# Patient Record
(demographics unavailable — no encounter records)

---

## 2024-11-05 NOTE — PHYSICAL EXAM
[Normal Breath Sounds] : Normal breath sounds [Normal Rate and Rhythm] : normal rate and rhythm [2+] : left 2+ [Varicose Veins Of Lower Extremities] : bilaterally [Ankle Swelling Bilaterally] : severe [] : present [Ankle Swelling On The Left] : moderate [No Rash or Lesion] : No rash or lesion [Alert] : alert [Calm] : calm [JVD] : no jugular venous distention  [Ankle Swelling (On Exam)] : not present [Abdomen Tenderness] : ~T ~M No abdominal tenderness [de-identified] : Appears well

## 2024-11-05 NOTE — ASSESSMENT
[FreeTextEntry1] : 66-year-old male with venous insufficiency status post right lower extremity ablation with stab phlebectomy.  Pedal pulses are intact bilaterally.  There is no evidence of significant arterial sufficiency at this time.  Patient has recovered as anticipated postprocedure.  No further vascular intervention is required at this time.  Patient to follow-up as needed.

## 2024-11-05 NOTE — HISTORY OF PRESENT ILLNESS
[FreeTextEntry1] : Patient is a 66-year-old gentleman with venous insufficiency and symptomatic varicose veins of bilateral lower extremities with remote history of right leg DVT who presents to the office today for follow-up status post Right leg venous ablation.    Denies tissue loss.  Denies rest pain or claudication symptoms.  Denies fever or chills.  Denies chest pain or shortness of breath.

## 2024-11-27 NOTE — REVIEW OF SYSTEMS
[Weight Gain (___ Lbs)] : [unfilled] ~Ulb weight gain [Negative] : Heme/Lymph [SOB] : no shortness of breath [Dyspnea on exertion] : not dyspnea during exertion [Chest Discomfort] : no chest discomfort [Lower Ext Edema] : no extremity edema [Leg Claudication] : no intermittent leg claudication [Palpitations] : no palpitations [Syncope] : no syncope

## 2024-11-27 NOTE — DISCUSSION/SUMMARY
[FreeTextEntry1] : The patient is a 66-year-old gentleman factor V Leiden deficiency, hiatal hernia, DVT in the past, BPH, HTN, DM, PVC, with reactive HTN from neck pain and BPH keeping him up. .  #1 HTN- c/w amlodipine 5mg #2 Palpitations- PVCs controlled on toprol 50mg, #3 Endo- c/w levothyroxine, metformin for DM #4 Neuropathy- on nortryptilline #5 BPH- on tamsulosin,f/u urology #6 Factor V Leiden- stable, Blood type A, RLE superficial thrombophlebitis on Xarelto lifelong, f/u Dr. Joaquin #7 Ortho- herniated disc, f/u ortho 87 General- We discussed adherence to a low-fat low cholesterol, ADA diet, weight loss and regular daily exercise. Received Pfizer vaccines and booster.   [EKG obtained to assist in diagnosis and management of assessed problem(s)] : EKG obtained to assist in diagnosis and management of assessed problem(s)

## 2024-11-27 NOTE — HISTORY OF PRESENT ILLNESS
[FreeTextEntry1] : Seth has noticed increase in BP to 140s. Had venous ablation but no discomfort. Has slight dizziness. ENT negative. Cervical neck disc disease. Glucose 120s.

## 2025-01-25 NOTE — HISTORY OF PRESENT ILLNESS
[FreeTextEntry1] : CC: diabetes  This is a 66-year-old male with T2DM, primary hypothyroidism, vitamin D insufficiency, BPH, DVT, spinal stenosis, here for a follow-up.  He was diagnosed with diabetes approximately age 64. He is currently on metformin  mg 2x a day. He SMBG in the mornings and reports levels between 110-135.  Last ophthalmology visit was February 2024. Denies retinopathy.  Has neuropathy. Also reports numbness due to spinal stenosis. No known nephropathy. He is currently on vitamin D3 5000 IU 3x weekly and vitamin B12 1000 mcg daily.  He is not on an ACE inhibitor or ARB. He is not on a statin.   He is on levothyroxine 25 mcg daily.

## 2025-01-25 NOTE — ADDENDUM
[FreeTextEntry1] : By signing my name below, I, Katelyn Jaxon, attest that this document has been prepared under the direction and in the presence of Dr. Valdivia.     I, Bryan Valdivia MD, personally performed the services described in this documentation. All medical record entries made by the scribe were at my discretion and in my presence. I have reviewed the chart and discharge instructions (if applicable) and agree that the record reflects my personal permanence and is accurate and complete.

## 2025-01-25 NOTE — PHYSICAL EXAM
[Alert] : alert [Well Nourished] : well nourished [Healthy Appearance] : healthy appearance [No Acute Distress] : no acute distress [Well Developed] : well developed [Normal Voice/Communication] : normal voice communication [Normal Sclera/Conjunctiva] : normal sclera/conjunctiva [No Proptosis] : no proptosis [No Respiratory Distress] : no respiratory distress [Normal Rate] : heart rate was normal [Normal Affect] : the affect was normal [Normal Insight/Judgement] : insight and judgment were intact [Normal Mood] : the mood was normal [Acanthosis Nigricans] : no acanthosis nigricans [de-identified] : Foot exam deferred. Patient goes to a podiatrist.

## 2025-01-25 NOTE — ASSESSMENT
[FreeTextEntry1] : This is a 66-year-old male with T2DM, primary hypothyroidism, vitamin D insufficiency, BPH, DVT, spinal stenosis, here for a follow-up.  Check hbA1c.  Continue metformin  mg 2x a day for now. Check vitamin B12 as he is on metformin.  Last ophthalmology visit was February 2024. Denies retinopathy.  Has neuropathy. Also reports numbness due to spinal stenosis. No known nephropathy. Check urine microalbumin.  He is currently on vitamin D3 5000 IU 3x weekly and vitamin B12 1000 mcg daily. Check 25 vitamin D. He is not on an ACE inhibitor or ARB. BP elevated. Check repeat He is not on a statin. Check lipid panel.  Continue levothyroxine 25 mcg daily. Check TFTs

## 2025-01-25 NOTE — PHYSICAL EXAM
[Alert] : alert [Well Nourished] : well nourished [Healthy Appearance] : healthy appearance [No Acute Distress] : no acute distress [Well Developed] : well developed [Normal Voice/Communication] : normal voice communication [Normal Sclera/Conjunctiva] : normal sclera/conjunctiva [No Proptosis] : no proptosis [No Respiratory Distress] : no respiratory distress [Normal Rate] : heart rate was normal [Normal Affect] : the affect was normal [Normal Insight/Judgement] : insight and judgment were intact [Normal Mood] : the mood was normal [Acanthosis Nigricans] : no acanthosis nigricans [de-identified] : Foot exam deferred. Patient goes to a podiatrist.

## 2025-02-03 NOTE — ASSESSMENT
[FreeTextEntry1] : Very pleasant 66-year-old gentleman who presents for follow-up of erectile dysfunction, BPH, -Continue tamsulosin for BPH-refill sent to the pharmacy -Continue finasteride for BPH-refill sent to the pharmacy -Continue Cialis for erectile dysfunction -Creatinine 0.91 -A1c 6.3% -Check urine culture -We discussed various outlet procedures as well as other options for management of BPH.  He would like to consider an outlet procedure at this time -Cystoscopy at next visit  Patient is being seen today for evaluation and management of a chronic and longitudinal ongoing condition and I am of the primary treating physician

## 2025-02-03 NOTE — HISTORY OF PRESENT ILLNESS
[FreeTextEntry1] : Very pleasant 66-year-old gentleman who presents for follow-up of BPH with urinary obstruction, erectile dysfunction, screening for prostate cancer.  Most recent PSA from 2/2024 was noted to be 0.56. He  continues to take tamsulosin and finasteride with improvement in his urinary symptoms, however he still reports bothersome urinary symptoms. He reports nocturia has increased up to 4 times per night on the medication. Cialis 20 mg continues to improve his erections.  He is now interested in considering an outlet procedure.

## 2025-02-07 NOTE — DATA REVIEWED
[FreeTextEntry1] : office xrays of cervical ap and lateral shows head tilted forward and loss of lordosis, DDD worse in lower cervical spine with osteophytes, diffuse facet oa.   office xrays of right knee ap, lat, sunrise show mild/moderate medial knee oa and mild medial patellar facet oa.

## 2025-02-07 NOTE — HISTORY OF PRESENT ILLNESS
[FreeTextEntry1] : Location: neck Severity: 5/10 Duration: over 3 yr Aggravating Factors: turning, sleeping Alleviating Factors: PT Associated Symptoms: denies weight loss, fever, chills, change in bowel/bladder habits, weakness, numbness/tingling, radiation down arm Prior Studies: xray 9/21, MRI 2022  Right knee hurting lately. 8/10.  Pain with stairs.  +occasional buckling but no swelling.

## 2025-02-07 NOTE — ASSESSMENT
[FreeTextEntry1] : Discussed diagnosis and treatment plan including PT. stop nsaids.  Educated not to take due to xarelto ice area often discussed knee injection get back to HEP for neck improve posture Check glucose for the next few days and adjust meds.  Call PCP if too high.  f/u 3 wk or sooner if has pain

## 2025-02-07 NOTE — PROCEDURE
[de-identified] : Indication: myofascial pain   After informed consent, he elected to proceed with a trigger point injection into the right cervical paraspinals. I confirmed no prior adverse reactions, no active infections, and no relevant allergies.   The skin was prepped in the usual sterile manner.  The sites were injected with Lidocaine followed by local needling. The injection was completed without complication and a bandage was applied. He tolerated the procedure well and was given post-injection instructions.   Cold Tx x 48 hours, analgesics prn. Medications: 0.5 ml of 1% Lidocaine per site   Exp 7/2025 Manufacture: [id number] LOT [id number]

## 2025-02-07 NOTE — PHYSICAL EXAM
[FreeTextEntry1] :  ALYSON is a 66 year   old male   Constitutional: healthy appearing, NAD, and normal body habitus   NECK ROM: flexion to 10, ext to 10, rotation to 30 deg bilat   Inspection: no erythema, warmth Spine: no TTP in spinous process Soft tissue palpation:  TTP in cervical paraspinals, no TTP in trapezius   5/5 bilateral elb flex/ext, WE, finger abd/flex sensation intact in bilat UE  Special tests: neg Spurling  right KNEE: no swelling, erythema, warmth flexion to 110 deg, ext normal

## 2025-03-05 NOTE — ASSESSMENT
[FreeTextEntry1] : Very pleasant 66-year-old gentleman who presents for follow-up of BPH, nocturia, bothersome urinary symptoms -Cystoscopy today demonstrates a slightly enlarged prostate without significant evidence of obstruction -Continue tamsulosin -Continue finasteride -Discussed the option to perform urodynamics -Discussed option to start a trial of an anticholinergic or beta 3 agonist medication.  We discussed option to try desmopressin for nighttime urinary symptoms -At this time he would like to try a beta 3 agonist -Trial of Gemtesa -We discussed risks and benefits of Gemtesa -Follow-up in 1 month to assess efficacy of his symptoms after starting the medication

## 2025-03-05 NOTE — HISTORY OF PRESENT ILLNESS
[FreeTextEntry1] : Very pleasant 66-year-old gentleman who presents for follow-up of BPH with urinary obstruction, erectile dysfunction, nocturia, screening for prostate cancer.  Most recent PSA from 2/2024 was noted to be 0.56. He continues to take tamsulosin and finasteride with improvement in his urinary symptoms, however he still reports bothersome nocturia up to 4 times per night. Cialis 20 mg continues to improve his erections.  He underwent a cystoscopy today which demonstrated a slightly enlarged prostate but no significant evidence of obstruction.

## 2025-04-16 NOTE — DISCUSSION/SUMMARY
[FreeTextEntry1] : The patient is a 66-year-old gentleman factor V Leiden deficiency, HH, DVT in the past, BPH, HTN, DM, PVC with nocturia. #1 HTN- c/w amlodipine 5mg #2 Palpitations- PVCs controlled on metoprolol 50mg, #3 Endo- c/w levothyroxine, metformin for DM #4 Neuropathy- on nortriptyline #5 BPH- on tamsulosin and now on Tropism, f/u Dr. Sanchez #6 Factor V Leiden- stable, Blood type A, RLE superficial thrombophlebitis on Xarelto lifelong, f/u Dr. Joaquin #7 Ortho- herniated disc, f/u ortho 87 General- We discussed adherence to a low-fat low cholesterol, ADA diet, weight loss and regular daily exercise. Received Pfizer vaccines and booster.   [EKG obtained to assist in diagnosis and management of assessed problem(s)] : EKG obtained to assist in diagnosis and management of assessed problem(s)

## 2025-04-28 NOTE — ASSESSMENT
[FreeTextEntry1] : Very pleasant 66-year-old gentleman who presents for follow-up of BPH, nocturia, bothersome urinary symptoms -Cystoscopy at last visit demonstrated a slightly enlarged prostate without significant evidence of obstruction -Continue tamsulosin for BPH -Continue finasteride for BPH -Stop Trospium given lack of efficacy -Trial of desmopressin -Discussed R/B/A of desmopressin -BMP -Lipid panel -PSA -Vitamin B12 -A1c -F/U in 1 week to recheck BMP F/U in 1 month  Patient is being seen today for evaluation and management of a chronic and longitudinal ongoing condition and I am the primary treating physician

## 2025-04-28 NOTE — HISTORY OF PRESENT ILLNESS
[FreeTextEntry1] : Very pleasant 66-year-old gentleman who presents for follow-up of BPH with urinary obstruction, erectile dysfunction, nocturia, screening for prostate cancer.  Most recent PSA from 7/2024 was noted to rise to 1.15 from 0.56In February 2024. He continues to take tamsulosin and finasteride with improvement in his urinary symptoms.  He underwent a cystoscopy at his last visit which demonstrated a slightly enlarged prostate but no significant evidence of obstruction.  At last visit he was started on trospium for urinary urgency and bothersome nocturia, however still reports nocturia x 3-4.

## 2025-06-09 NOTE — PHYSICAL EXAM
[Normal Appearance] : normal appearance [Well Groomed] : well groomed [General Appearance - In No Acute Distress] : no acute distress [Edema] : no peripheral edema [Respiration, Rhythm And Depth] : normal respiratory rhythm and effort [Exaggerated Use Of Accessory Muscles For Inspiration] : no accessory muscle use [Abdomen Tenderness] : non-tender [Abdomen Soft] : soft [Normal Station and Gait] : the gait and station were normal for the patient's age [Urinary Bladder Findings] : the bladder was normal on palpation [Costovertebral Angle Tenderness] : no ~M costovertebral angle tenderness [] : no rash [No Focal Deficits] : no focal deficits [Oriented To Time, Place, And Person] : oriented to person, place, and time [Mood] : the mood was normal [Affect] : the affect was normal [No Palpable Adenopathy] : no palpable adenopathy

## 2025-06-09 NOTE — ASSESSMENT
[FreeTextEntry1] : Very pleasant 67-year-old gentleman who presents for follow-up of BPH, nocturia, bothersome urinary symptoms -Cystoscopy previously demonstrated a slightly enlarged prostate without significant evidence of obstruction -Continue tamsulosin for BPH -Continue finasteride for BPH - Patient previously stopped taking trospium given lack of efficacy with the medication - Continue desmopressin- refill sent to the pharmacy - Sodium 141; repeat BMP -PSA 0.65 -A1c 6.4% - Follow-up in 6 months  Patient is being seen today for evaluation and management of a chronic and longitudinal ongoing condition and I am the primary treating physician

## 2025-06-09 NOTE — HISTORY OF PRESENT ILLNESS
[FreeTextEntry1] : Very pleasant 67-year-old gentleman who presents for follow-up of BPH with urinary obstruction, erectile dysfunction, nocturia, screening for prostate cancer.  Most recent PSA from 4/2025 was noted to be 0.65. He continues to take tamsulosin and finasteride with improvement in his urinary symptoms.  He underwent a cystoscopy previously which demonstrated a slightly enlarged prostate but no significant evidence of obstruction.  At last visit he was started on desmopressin for bothersome nocturia.